# Patient Record
Sex: FEMALE | Race: WHITE | NOT HISPANIC OR LATINO | URBAN - METROPOLITAN AREA
[De-identification: names, ages, dates, MRNs, and addresses within clinical notes are randomized per-mention and may not be internally consistent; named-entity substitution may affect disease eponyms.]

---

## 2017-04-20 NOTE — PATIENT DISCUSSION
(H40.013) Open angle with borderline findings, low risk, bilateral - Assesment : Examination revealed suspicion for Open Angle Glaucoma. OCT ONH stable today. - Plan : Monitor for changes. RTC in 6 months for IOP check and HVF, sooner if problems or changes occur.

## 2017-04-20 NOTE — PATIENT DISCUSSION
(H25.13) Age-related nuclear cataract, bilateral - Assesment : Examination revealed cataract. Pt is symptomatic and bothered. Pt will be traveling in and out of town throughout the Summer. - Plan : Recommended cataract extraction to improve visual function. Discussed procedure, lens options, PO period, risks, and benefits. Discussed Symfony Lens and handout given today. Answered all questions. Pt wishes to proceed. Schedule ASCAN and consult.

## 2017-08-10 ENCOUNTER — IMPORTED ENCOUNTER (OUTPATIENT)
Dept: URBAN - METROPOLITAN AREA CLINIC 50 | Facility: CLINIC | Age: 77
End: 2017-08-10

## 2017-08-24 NOTE — PATIENT DISCUSSION
(H52.223) Regular astigmatism, bilateral - Assesment : Refractive testing reveals astigmatism. - Plan : TORIC IOL OU PLANNED FOR CATARACT SURGERY.

## 2017-08-24 NOTE — PATIENT DISCUSSION
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 - Plan : Monitor for changes. Advised patient to call our office with decreased vision or increased symptoms. ECC performed [address] AFTER CATARACT SURGERY.

## 2017-08-24 NOTE — PATIENT DISCUSSION
(K99.316) Vitreous degeneration, bilateral - Assesment : Examination revealed PVD - Plan : Monitor for changes. Advised patient to call our office with decreased vision or an increase in flashes and/or floaters.

## 2017-11-01 NOTE — PATIENT DISCUSSION
(Z96.1) Presence of intraocular lens - Assesment : Patient is Pseudophakic. ORA done in OR during surgery - Plan : Discussed signs and symptoms of infection and retinal detachments. Do not rub operated eye. Follow drop schedule If redness,pain,decreased vision, flashes or floaters occur then contact clinic.  Increase PRED-BROM-MOXI to QID until next week 1 week refraction/dilation

## 2017-11-07 NOTE — PATIENT DISCUSSION
(Z96.1) Presence of intraocular lens - Assesment : Patient is Pseudophakic. OS: TEMPORAL K EDEMA MILD, OVERALL K EDEMA IMPROVED. PATIENT HAPPY WITH VISION, ABLE TOR READ WITHOUT GLASSES AT THIS TIME. - Plan : Signs and symptoms of infection and retinal detachment are outlined in your surgical packet. Do not rub operated eye. Follow drop schedule. If redness, pain, decreased vision, flashes or floaters occur then contact clinic. PAUL GOMEZ OR [facility]. 3-4 WEEK/ FINAL POST OP/ REFRACT/ MAC OCT WITH DR. SKY

## 2017-11-28 NOTE — PATIENT DISCUSSION
(Z96.1) Presence of intraocular lens - Assesment : Patient is Pseudophakic OU. Doing well. - Plan : Signs and symptoms of infection and retinal detachment are outlined in your surgical packet. Do not rub operated eye. Follow drop schedule. If redness, pain, decreased vision, flashes or floaters occur then contact clinic.

## 2017-11-28 NOTE — PATIENT DISCUSSION
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 - Plan : Monitor for changes. Advised patient to call our office with decreased vision or increased symptoms. Continue Soothe XP qhs and increase Soothe to at least 3-4 times during the day. Soothe coupon given today. RTC in 3 weeks for F/U with Refraction and ECC, sooner if problems or changes.

## 2017-11-28 NOTE — PATIENT DISCUSSION
(F71.815) Other secondary cataract, bilateral - Assesment : Mild posterior capsule opacification present. - Plan : Monitor for Changes. Advised patient of condition and symptoms of worsening and becoming more bothersome. Pt to call our office with decreased vision or increased symptoms.

## 2017-12-19 NOTE — PATIENT DISCUSSION
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 Pt has been using Soothe 3-4 times per day, stopped Soothe XP qhs due to blurred vision in am. - Plan : Monitor for changes. Continue Soothe to at least 3-4 times during the day. Recommended OTC Nicole 128 1-4 times per day. Updaqted GLRx given today. Call with any problems or vision changes. RTC in Summer for Exam and OCT ONH, sooner if problems or changes.   Addendum from 5/29/18: please do definitely do an ECC at next visit and an [address] if VA cannot be corrected with MRx [facility]

## 2018-06-22 NOTE — PATIENT DISCUSSION
(R03.791) Other secondary cataract, bilateral - Assesment : Posterior capsule opacification present. Pt is bothered and symptomatic by hazy vision. - Plan : Recommend that patient undergo a YAG Capsulotomy OU (Both Eyes). Discussed that opacity is the cause of the decreased vision. Discussed the risks and benefits of performing a YAG Capsulotomy including the risk of floaters, retinal detachment, and retinal swelling. Patient understands to expect floaters after the YAG capsulotomy procedures and understands that they may remain indefinitely. All questions answered and handout given today. Pt wishes to proceed. Schedule Yag Cap OU.

## 2018-06-22 NOTE — PATIENT DISCUSSION
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 Pt reports using Soothe during the day and Soothe XP qhs. Patient states he was unable to find Nicole 128 gtts - Plan : Monitor for changes. Continue Soothe during the day and Soothe XP qhs. If visual symptoms do not improve with Yag OU will refer to Corneal Specialist for evaluation and possible treatment.

## 2018-06-22 NOTE — PATIENT DISCUSSION
(H40.013) Open angle with borderline findings, low risk, bilateral - Assesment : Examination revealed suspicion for Open Angle Glaucoma. OCT [address] 74 ordered and performed today to evaluate nerves. - Plan : Monitor for IOP and NFL changes with visits and OCT ONHs.

## 2018-07-19 NOTE — PATIENT DISCUSSION
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 Pt reports much improvement in visual symptoms a/p Yag OU. - Plan : Monitor for changes.  Continue Soothe during the day and Soothe XP qhs.

## 2018-07-19 NOTE — PATIENT DISCUSSION
(T17.567) Other secondary cataract, bilateral - Assesment : s/p Yag Cap OU. Doing well, very happy. - Plan : Monitor for changes. Advised patient to call our office with decreased vision or an increase in symptoms. RTC in 1 year for Exam and OCT ONH, sooner if problems or changes.

## 2019-02-08 NOTE — PATIENT DISCUSSION
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 Explained NEIDA and Endothelial Dystrophy. Continue Soothe and Soothe XP. Recommended trying EZ Tears supplements. Handout given and explained that takes about 3-4 weeks of use before will notice improvement. Refer to Corneal Specialist for evaluation and possible treatment. RTC here as scheduled in July for Exam and OCT ONH, sooner if problems or changes.

## 2019-02-08 NOTE — PATIENT DISCUSSION
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 - Plan : Refer to Corneal Specialist for evaluation and possible treatment. Continue Soothe during the day and Soothe XP qhs.  Hold off on Valri Sinning until sees Specialist.

## 2019-06-14 ENCOUNTER — IMPORTED ENCOUNTER (OUTPATIENT)
Dept: URBAN - METROPOLITAN AREA CLINIC 50 | Facility: CLINIC | Age: 79
End: 2019-06-14

## 2019-08-16 ENCOUNTER — IMPORTED ENCOUNTER (OUTPATIENT)
Dept: URBAN - METROPOLITAN AREA CLINIC 50 | Facility: CLINIC | Age: 79
End: 2019-08-16

## 2019-08-26 ENCOUNTER — IMPORTED ENCOUNTER (OUTPATIENT)
Dept: URBAN - METROPOLITAN AREA CLINIC 50 | Facility: CLINIC | Age: 79
End: 2019-08-26

## 2019-08-26 NOTE — PATIENT DISCUSSION
"""Re-refracted patient at todays visit.  Recommended to patient to try progressive lenses as she ""

## 2020-08-03 ENCOUNTER — IMPORTED ENCOUNTER (OUTPATIENT)
Dept: URBAN - METROPOLITAN AREA CLINIC 50 | Facility: CLINIC | Age: 80
End: 2020-08-03

## 2020-08-12 ENCOUNTER — IMPORTED ENCOUNTER (OUTPATIENT)
Dept: URBAN - METROPOLITAN AREA CLINIC 50 | Facility: CLINIC | Age: 80
End: 2020-08-12

## 2020-08-12 NOTE — PATIENT DISCUSSION
"""Dermatochalasis LON bothersome to patient. Recommend ptosis visual field and photos at patient's convenience for possible blepharoplasty. Patient will be contacted with results.  """

## 2020-09-04 ENCOUNTER — IMPORTED ENCOUNTER (OUTPATIENT)
Dept: URBAN - METROPOLITAN AREA CLINIC 50 | Facility: CLINIC | Age: 80
End: 2020-09-04

## 2020-09-29 ENCOUNTER — IMPORTED ENCOUNTER (OUTPATIENT)
Dept: URBAN - METROPOLITAN AREA CLINIC 50 | Facility: CLINIC | Age: 80
End: 2020-09-29

## 2020-10-30 ENCOUNTER — IMPORTED ENCOUNTER (OUTPATIENT)
Dept: URBAN - METROPOLITAN AREA CLINIC 50 | Facility: CLINIC | Age: 80
End: 2020-10-30

## 2020-12-18 ENCOUNTER — IMPORTED ENCOUNTER (OUTPATIENT)
Dept: URBAN - METROPOLITAN AREA CLINIC 50 | Facility: CLINIC | Age: 80
End: 2020-12-18

## 2020-12-18 NOTE — PATIENT DISCUSSION
"""Recommend Bilateral Upper Lid Blepharoplasty 12/29/2020 at [facility]. Discontinue blood thinner 5 days prior to surgery.  Discussed with patient CHUCKY

## 2021-01-08 ENCOUNTER — IMPORTED ENCOUNTER (OUTPATIENT)
Dept: URBAN - METROPOLITAN AREA CLINIC 50 | Facility: CLINIC | Age: 81
End: 2021-01-08

## 2021-01-29 ENCOUNTER — IMPORTED ENCOUNTER (OUTPATIENT)
Dept: URBAN - METROPOLITAN AREA CLINIC 50 | Facility: CLINIC | Age: 81
End: 2021-01-29

## 2021-04-18 ASSESSMENT — VISUAL ACUITY
OS_CC: 20/20-1
OS_CC: 20/25-2
OS_CC: 20/25
OS_CC: 20/25+1
OS_OTHER: >20/400. >20/400.
OS_CC: J1+
OD_CC: J1+
OD_CC: J1+/-
OD_CC: 20/25+1
OS_CC: 20/30
OD_CC: 20/30+2
OD_PH: 20/30
OD_CC: 20/25
OD_OTHER: 20/80. >20/400.
OD_CC: 20/25
OD_CC: 20/40
OD_CC: 20/30-2
OS_CC: 20/20-1
OD_CC: 20/30-2
OS_CC: 20/25
OS_CC: J1+/-
OD_CC: J1+
OS_BAT: >20/400
OD_BAT: 20/80
OS_CC: J1+

## 2021-04-18 ASSESSMENT — TONOMETRY
OD_IOP_MMHG: 14
OD_IOP_MMHG: 13
OD_IOP_MMHG: 14
OD_IOP_MMHG: 16
OS_IOP_MMHG: 16
OS_IOP_MMHG: 15
OS_IOP_MMHG: 14
OS_IOP_MMHG: 14

## 2021-06-16 ENCOUNTER — PREPPED CHART (OUTPATIENT)
Dept: URBAN - METROPOLITAN AREA CLINIC 52 | Facility: CLINIC | Age: 81
End: 2021-06-16

## 2021-06-18 ENCOUNTER — ROUTINE EXAM (OUTPATIENT)
Dept: URBAN - METROPOLITAN AREA CLINIC 52 | Facility: CLINIC | Age: 81
End: 2021-06-18

## 2021-06-18 DIAGNOSIS — H35.363: ICD-10-CM

## 2021-06-18 DIAGNOSIS — Z01.01: ICD-10-CM

## 2021-06-18 PROCEDURE — 92014 COMPRE OPH EXAM EST PT 1/>: CPT

## 2021-06-18 PROCEDURE — 92015 DETERMINE REFRACTIVE STATE: CPT

## 2021-06-18 PROCEDURE — 92134 CPTRZ OPH DX IMG PST SGM RTA: CPT

## 2021-06-18 ASSESSMENT — VISUAL ACUITY
OD_GLARE: 20/60
OS_GLARE: 20/20
OS_PH: 20/20-
OS_CC: 20/30+2
OS_GLARE: 20/25
OD_GLARE: 20/40
OU_CC: J1
OD_CC: 20/30

## 2021-06-18 ASSESSMENT — TONOMETRY
OS_IOP_MMHG: 16
OD_IOP_MMHG: 16

## 2023-07-07 ENCOUNTER — COMPREHENSIVE EXAM (OUTPATIENT)
Dept: URBAN - METROPOLITAN AREA CLINIC 52 | Facility: CLINIC | Age: 83
End: 2023-07-07

## 2023-07-07 DIAGNOSIS — H35.3131: ICD-10-CM

## 2023-07-07 DIAGNOSIS — H52.4: ICD-10-CM

## 2023-07-07 DIAGNOSIS — Z01.01: ICD-10-CM

## 2023-07-07 PROCEDURE — 92015 DETERMINE REFRACTIVE STATE: CPT

## 2023-07-07 PROCEDURE — 92134 CPTRZ OPH DX IMG PST SGM RTA: CPT

## 2023-07-07 PROCEDURE — 92014 COMPRE OPH EXAM EST PT 1/>: CPT

## 2023-07-07 ASSESSMENT — VISUAL ACUITY
OS_GLARE: 20/40-1
OD_GLARE: 20/25-1
OS_GLARE: 20/40
OS_CC: 20/30
OU_CC: J1@16"
OD_CC: 20/25-1
OD_GLARE: 20/25-1

## 2023-07-07 ASSESSMENT — TONOMETRY
OD_IOP_MMHG: 10
OS_IOP_MMHG: 10

## 2024-01-22 ENCOUNTER — EMERGENCY VISIT (OUTPATIENT)
Dept: URBAN - METROPOLITAN AREA CLINIC 50 | Facility: LOCATION | Age: 84
End: 2024-01-22

## 2024-01-22 DIAGNOSIS — S05.02XA: ICD-10-CM

## 2024-01-22 DIAGNOSIS — H26.493: ICD-10-CM

## 2024-01-22 PROCEDURE — 92071 CONTACT LENS FITTING FOR TX: CPT

## 2024-01-22 PROCEDURE — 99212 OFFICE O/P EST SF 10 MIN: CPT

## 2024-01-22 RX ORDER — OFLOXACIN 3 MG/ML
1 SOLUTION/ DROPS OPHTHALMIC TWICE A DAY
Start: 2024-01-22

## 2024-01-22 ASSESSMENT — VISUAL ACUITY
OS_CC: 20/40
OD_CC: 20/25

## 2024-01-22 ASSESSMENT — TONOMETRY
OD_IOP_MMHG: 14
OS_IOP_MMHG: 14

## 2024-01-29 ENCOUNTER — FOLLOW UP (OUTPATIENT)
Dept: URBAN - METROPOLITAN AREA CLINIC 50 | Facility: LOCATION | Age: 84
End: 2024-01-29

## 2024-01-29 DIAGNOSIS — S05.02XA: ICD-10-CM

## 2024-01-29 PROCEDURE — 99213 OFFICE O/P EST LOW 20 MIN: CPT

## 2024-01-29 ASSESSMENT — VISUAL ACUITY
OD_CC: 20/30-1
OS_CC: 20/40-2

## 2024-02-05 ENCOUNTER — FOLLOW UP (OUTPATIENT)
Dept: URBAN - METROPOLITAN AREA CLINIC 50 | Facility: LOCATION | Age: 84
End: 2024-02-05

## 2024-02-05 DIAGNOSIS — S05.02XA: ICD-10-CM

## 2024-02-05 PROCEDURE — 99213 OFFICE O/P EST LOW 20 MIN: CPT

## 2024-02-05 ASSESSMENT — VISUAL ACUITY
OS_CC: 20/30-2
OU_CC: 20/30
OD_CC: 20/25-2

## 2024-02-05 ASSESSMENT — TONOMETRY: OD_IOP_MMHG: 13

## 2024-02-19 ENCOUNTER — FOLLOW UP (OUTPATIENT)
Dept: URBAN - METROPOLITAN AREA CLINIC 50 | Facility: LOCATION | Age: 84
End: 2024-02-19

## 2024-02-19 DIAGNOSIS — S05.02XA: ICD-10-CM

## 2024-02-19 PROCEDURE — 99213 OFFICE O/P EST LOW 20 MIN: CPT

## 2024-02-19 ASSESSMENT — VISUAL ACUITY
OS_CC: 20/30+1
OD_CC: 20/30+1
OU_CC: 20/25-1

## 2024-02-19 ASSESSMENT — TONOMETRY
OD_IOP_MMHG: 11
OS_IOP_MMHG: 10